# Patient Record
Sex: MALE | Race: WHITE | NOT HISPANIC OR LATINO | Employment: OTHER | URBAN - METROPOLITAN AREA
[De-identification: names, ages, dates, MRNs, and addresses within clinical notes are randomized per-mention and may not be internally consistent; named-entity substitution may affect disease eponyms.]

---

## 2018-07-24 ENCOUNTER — APPOINTMENT (OUTPATIENT)
Dept: PREADMISSION TESTING | Facility: HOSPITAL | Age: 58
End: 2018-07-24

## 2018-07-24 ENCOUNTER — HOSPITAL ENCOUNTER (OUTPATIENT)
Dept: GENERAL RADIOLOGY | Facility: HOSPITAL | Age: 58
Discharge: HOME | End: 2018-07-24
Admitting: COLON & RECTAL SURGERY

## 2018-07-24 VITALS — WEIGHT: 255.73 LBS | HEIGHT: 76 IN | BODY MASS INDEX: 31.14 KG/M2

## 2018-07-24 LAB
ANION GAP SERPL CALCULATED.3IONS-SCNC: 4 MMOL/L (ref 3–11)
APTT PPP: 26.8 SECONDS (ref 24–31)
BUN BLD-MCNC: 13 MG/DL (ref 9–23)
BUN/CREAT SERPL: 11.2 (ref 7–25)
CALCIUM SPEC-SCNC: 9.2 MG/DL (ref 8.7–10.4)
CHLORIDE SERPL-SCNC: 105 MMOL/L (ref 99–109)
CO2 SERPL-SCNC: 30 MMOL/L (ref 20–31)
CREAT BLD-MCNC: 1.16 MG/DL (ref 0.6–1.3)
DEPRECATED RDW RBC AUTO: 50.4 FL (ref 37–54)
ERYTHROCYTE [DISTWIDTH] IN BLOOD BY AUTOMATED COUNT: 18.4 % (ref 11.3–14.5)
GFR SERPL CREATININE-BSD FRML MDRD: 65 ML/MIN/1.73
GLUCOSE BLD-MCNC: 111 MG/DL (ref 70–100)
HBA1C MFR BLD: 5.9 % (ref 4.8–5.6)
HCT VFR BLD AUTO: 34.1 % (ref 38.9–50.9)
HGB BLD-MCNC: 9.9 G/DL (ref 13.1–17.5)
INR PPP: 1.03 (ref 0.91–1.09)
MCH RBC QN AUTO: 21.7 PG (ref 27–31)
MCHC RBC AUTO-ENTMCNC: 29 G/DL (ref 32–36)
MCV RBC AUTO: 74.8 FL (ref 80–99)
PLATELET # BLD AUTO: 116 10*3/MM3 (ref 150–450)
PMV BLD AUTO: 8.8 FL (ref 6–12)
POTASSIUM BLD-SCNC: 4 MMOL/L (ref 3.5–5.5)
PROTHROMBIN TIME: 10.8 SECONDS (ref 9.6–11.5)
RBC # BLD AUTO: 4.56 10*6/MM3 (ref 4.2–5.76)
SODIUM BLD-SCNC: 139 MMOL/L (ref 132–146)
WBC NRBC COR # BLD: 4.17 10*3/MM3 (ref 3.5–10.8)

## 2018-07-24 PROCEDURE — 80048 BASIC METABOLIC PNL TOTAL CA: CPT | Performed by: COLON & RECTAL SURGERY

## 2018-07-24 PROCEDURE — 83036 HEMOGLOBIN GLYCOSYLATED A1C: CPT | Performed by: COLON & RECTAL SURGERY

## 2018-07-24 PROCEDURE — 93005 ELECTROCARDIOGRAM TRACING: CPT

## 2018-07-24 PROCEDURE — 93010 ELECTROCARDIOGRAM REPORT: CPT | Performed by: INTERNAL MEDICINE

## 2018-07-24 PROCEDURE — 71046 X-RAY EXAM CHEST 2 VIEWS: CPT

## 2018-07-24 PROCEDURE — 36415 COLL VENOUS BLD VENIPUNCTURE: CPT

## 2018-07-24 PROCEDURE — 85730 THROMBOPLASTIN TIME PARTIAL: CPT | Performed by: ANESTHESIOLOGY

## 2018-07-24 PROCEDURE — 85027 COMPLETE CBC AUTOMATED: CPT | Performed by: COLON & RECTAL SURGERY

## 2018-07-24 PROCEDURE — 85610 PROTHROMBIN TIME: CPT | Performed by: ANESTHESIOLOGY

## 2018-07-24 RX ORDER — METOPROLOL TARTRATE 50 MG/1
50 TABLET, FILM COATED ORAL 2 TIMES DAILY
COMMUNITY

## 2018-07-24 RX ORDER — SUCRALFATE 1 G/1
1 TABLET ORAL 2 TIMES DAILY
COMMUNITY

## 2018-07-24 RX ORDER — LEVOTHYROXINE SODIUM 0.07 MG/1
75 TABLET ORAL DAILY
COMMUNITY

## 2018-07-24 RX ORDER — FUROSEMIDE 40 MG/1
40 TABLET ORAL DAILY
Status: ON HOLD | COMMUNITY
End: 2018-09-13

## 2018-07-24 RX ORDER — ASPIRIN 81 MG/1
81 TABLET ORAL DAILY
COMMUNITY

## 2018-07-24 RX ORDER — LANSOPRAZOLE 30 MG/1
30 CAPSULE, DELAYED RELEASE ORAL 2 TIMES DAILY
COMMUNITY

## 2018-07-24 RX ORDER — CHLORAL HYDRATE 500 MG
1000 CAPSULE ORAL 2 TIMES DAILY WITH MEALS
COMMUNITY

## 2018-07-24 RX ORDER — WARFARIN SODIUM 10 MG/1
10 TABLET ORAL
COMMUNITY

## 2018-07-24 NOTE — PAT
"Patient's PPM was interoggated in PAT - results on chart.  Dr. Peña \" okayed\" patient for surgery.  Patient instructed to drink 20 ounces (or until full) of Gatorade or 20 ounces of G2 (if diabetic) and complete 3 hours before your surgery start time. (NO RED Gatorade or G2)    Patient verbalized understanding.  "

## 2018-07-24 NOTE — DISCHARGE INSTRUCTIONS

## 2018-07-27 NOTE — PAT
"Spoke with Donna with Dr. George's office. Patient contacted Dr. George's office to tell them he had an appointment with his cardiologist,  \"A\" , and that he does not want him to have surgery until he has a heart cath scheduled for 8-7-18. Donna stated patient is cancelled and she will f/u with the patient after his heart cath. Donna stated patient is aware.   "

## 2018-07-27 NOTE — PAT
Myesha Virk, patients wife called PAT and stated the Patient saw his cardiologist after his PAT appointment for a checkup. His cardiologist wants him to have a heart cath before surgery. The heart cath is scheduled for 8-7-18. Dr. George's office notified; message left for Donna ; will f/u if no call back.

## 2018-09-10 ENCOUNTER — ANESTHESIA EVENT (OUTPATIENT)
Dept: PERIOP | Facility: HOSPITAL | Age: 58
End: 2018-09-10

## 2018-09-10 ENCOUNTER — HOSPITAL ENCOUNTER (INPATIENT)
Facility: HOSPITAL | Age: 58
LOS: 3 days | Discharge: HOME | End: 2018-09-13
Attending: COLON & RECTAL SURGERY | Admitting: COLON & RECTAL SURGERY

## 2018-09-10 ENCOUNTER — ANESTHESIA (OUTPATIENT)
Dept: PERIOP | Facility: HOSPITAL | Age: 58
End: 2018-09-10

## 2018-09-10 DIAGNOSIS — D12.6 TUBULOVILLOUS ADENOMA POLYP OF COLON: ICD-10-CM

## 2018-09-10 DIAGNOSIS — D12.2 ADENOMATOUS POLYP OF ASCENDING COLON: ICD-10-CM

## 2018-09-10 LAB
ANION GAP SERPL CALCULATED.3IONS-SCNC: 7 MMOL/L (ref 3–11)
APTT PPP: 27.8 SECONDS (ref 24–31)
BUN BLD-MCNC: 10 MG/DL (ref 9–23)
BUN/CREAT SERPL: 9 (ref 7–25)
CALCIUM SPEC-SCNC: 9.4 MG/DL (ref 8.7–10.4)
CHLORIDE SERPL-SCNC: 104 MMOL/L (ref 99–109)
CO2 SERPL-SCNC: 28 MMOL/L (ref 20–31)
CREAT BLD-MCNC: 1.11 MG/DL (ref 0.6–1.3)
DEPRECATED RDW RBC AUTO: 53.4 FL (ref 37–54)
ERYTHROCYTE [DISTWIDTH] IN BLOOD BY AUTOMATED COUNT: 18.2 % (ref 11.3–14.5)
GFR SERPL CREATININE-BSD FRML MDRD: 69 ML/MIN/1.73
GLUCOSE BLD-MCNC: 122 MG/DL (ref 70–100)
HCT VFR BLD AUTO: 39.7 % (ref 38.9–50.9)
HGB BLD-MCNC: 12.8 G/DL (ref 13.1–17.5)
INR PPP: 1.08 (ref 0.91–1.09)
MCH RBC QN AUTO: 26.1 PG (ref 27–31)
MCHC RBC AUTO-ENTMCNC: 32.2 G/DL (ref 32–36)
MCV RBC AUTO: 81 FL (ref 80–99)
PLATELET # BLD AUTO: 93 10*3/MM3 (ref 150–450)
PMV BLD AUTO: 8.5 FL (ref 6–12)
POTASSIUM BLD-SCNC: 3.3 MMOL/L (ref 3.5–5.5)
PROTHROMBIN TIME: 11.3 SECONDS (ref 9.6–11.5)
RBC # BLD AUTO: 4.9 10*6/MM3 (ref 4.2–5.76)
SODIUM BLD-SCNC: 139 MMOL/L (ref 132–146)
WBC NRBC COR # BLD: 4.56 10*3/MM3 (ref 3.5–10.8)

## 2018-09-10 PROCEDURE — 63710000001 HYDROCODONE-ACETAMINOPHEN 7.5-325 MG TABLET: Performed by: COLON & RECTAL SURGERY

## 2018-09-10 PROCEDURE — A9270 NON-COVERED ITEM OR SERVICE: HCPCS | Performed by: COLON & RECTAL SURGERY

## 2018-09-10 PROCEDURE — 25010000002 FENTANYL CITRATE (PF) 100 MCG/2ML SOLUTION: Performed by: NURSE ANESTHETIST, CERTIFIED REGISTERED

## 2018-09-10 PROCEDURE — 25010000002 PROPOFOL 10 MG/ML EMULSION: Performed by: NURSE ANESTHETIST, CERTIFIED REGISTERED

## 2018-09-10 PROCEDURE — 0DTF0ZZ RESECTION OF RIGHT LARGE INTESTINE, OPEN APPROACH: ICD-10-PCS | Performed by: COLON & RECTAL SURGERY

## 2018-09-10 PROCEDURE — 25010000002 MORPHINE SULFATE (PF) 2 MG/ML SOLUTION: Performed by: COLON & RECTAL SURGERY

## 2018-09-10 PROCEDURE — 85610 PROTHROMBIN TIME: CPT | Performed by: COLON & RECTAL SURGERY

## 2018-09-10 PROCEDURE — 63710000001 LEVOTHYROXINE 75 MCG TABLET: Performed by: COLON & RECTAL SURGERY

## 2018-09-10 PROCEDURE — 25010000002 NEOSTIGMINE 10 MG/10ML SOLUTION: Performed by: NURSE ANESTHETIST, CERTIFIED REGISTERED

## 2018-09-10 PROCEDURE — 63710000001 DIGOXIN 250 MCG TABLET: Performed by: COLON & RECTAL SURGERY

## 2018-09-10 PROCEDURE — 25010000002 ONDANSETRON PER 1 MG: Performed by: NURSE ANESTHETIST, CERTIFIED REGISTERED

## 2018-09-10 PROCEDURE — 80048 BASIC METABOLIC PNL TOTAL CA: CPT | Performed by: COLON & RECTAL SURGERY

## 2018-09-10 PROCEDURE — 25010000002 HEPARIN (PORCINE) 5000 UNIT/0.5ML SOLUTION: Performed by: COLON & RECTAL SURGERY

## 2018-09-10 PROCEDURE — 85027 COMPLETE CBC AUTOMATED: CPT | Performed by: COLON & RECTAL SURGERY

## 2018-09-10 PROCEDURE — 88309 TISSUE EXAM BY PATHOLOGIST: CPT | Performed by: COLON & RECTAL SURGERY

## 2018-09-10 PROCEDURE — 63710000001 DIAZEPAM 5 MG TABLET: Performed by: COLON & RECTAL SURGERY

## 2018-09-10 PROCEDURE — 25010000002 ERTAPENEM 1 GM/100ML SOLUTION: Performed by: COLON & RECTAL SURGERY

## 2018-09-10 PROCEDURE — 85730 THROMBOPLASTIN TIME PARTIAL: CPT | Performed by: COLON & RECTAL SURGERY

## 2018-09-10 PROCEDURE — 25010000002 DEXAMETHASONE PER 1 MG: Performed by: NURSE ANESTHETIST, CERTIFIED REGISTERED

## 2018-09-10 PROCEDURE — 0WQF0ZZ REPAIR ABDOMINAL WALL, OPEN APPROACH: ICD-10-PCS | Performed by: COLON & RECTAL SURGERY

## 2018-09-10 PROCEDURE — 25010000002 HEPARIN (PORCINE) PER 1000 UNITS: Performed by: COLON & RECTAL SURGERY

## 2018-09-10 PROCEDURE — 63710000001 METOPROLOL TARTRATE 50 MG TABLET: Performed by: COLON & RECTAL SURGERY

## 2018-09-10 PROCEDURE — 25010000002 BUPRENORPHINE PER 0.1 MG: Performed by: NURSE ANESTHETIST, CERTIFIED REGISTERED

## 2018-09-10 PROCEDURE — 63710000001 ACETAMINOPHEN 500 MG TABLET: Performed by: COLON & RECTAL SURGERY

## 2018-09-10 RX ORDER — DEXAMETHASONE SODIUM PHOSPHATE 4 MG/ML
INJECTION, SOLUTION INTRA-ARTICULAR; INTRALESIONAL; INTRAMUSCULAR; INTRAVENOUS; SOFT TISSUE AS NEEDED
Status: DISCONTINUED | OUTPATIENT
Start: 2018-09-10 | End: 2018-09-10 | Stop reason: SURG

## 2018-09-10 RX ORDER — LIDOCAINE HYDROCHLORIDE 10 MG/ML
INJECTION, SOLUTION EPIDURAL; INFILTRATION; INTRACAUDAL; PERINEURAL AS NEEDED
Status: DISCONTINUED | OUTPATIENT
Start: 2018-09-10 | End: 2018-09-10 | Stop reason: SURG

## 2018-09-10 RX ORDER — DIGOXIN 250 MCG
250 TABLET ORAL
Status: DISCONTINUED | OUTPATIENT
Start: 2018-09-10 | End: 2018-09-13 | Stop reason: HOSPADM

## 2018-09-10 RX ORDER — METOPROLOL TARTRATE 50 MG/1
50 TABLET, FILM COATED ORAL EVERY 12 HOURS SCHEDULED
Status: DISCONTINUED | OUTPATIENT
Start: 2018-09-10 | End: 2018-09-13 | Stop reason: HOSPADM

## 2018-09-10 RX ORDER — ALVIMOPAN 12 MG/1
12 CAPSULE ORAL 2 TIMES DAILY
Status: DISCONTINUED | OUTPATIENT
Start: 2018-09-11 | End: 2018-09-13 | Stop reason: HOSPADM

## 2018-09-10 RX ORDER — PROMETHAZINE HYDROCHLORIDE 25 MG/ML
6.25 INJECTION, SOLUTION INTRAMUSCULAR; INTRAVENOUS ONCE AS NEEDED
Status: DISCONTINUED | OUTPATIENT
Start: 2018-09-10 | End: 2018-09-10 | Stop reason: HOSPADM

## 2018-09-10 RX ORDER — SODIUM CHLORIDE 0.9 % (FLUSH) 0.9 %
1-10 SYRINGE (ML) INJECTION AS NEEDED
Status: DISCONTINUED | OUTPATIENT
Start: 2018-09-10 | End: 2018-09-10 | Stop reason: HOSPADM

## 2018-09-10 RX ORDER — MORPHINE SULFATE 2 MG/ML
2 INJECTION, SOLUTION INTRAMUSCULAR; INTRAVENOUS
Status: DISCONTINUED | OUTPATIENT
Start: 2018-09-10 | End: 2018-09-13 | Stop reason: HOSPADM

## 2018-09-10 RX ORDER — HEPARIN SODIUM 5000 [USP'U]/ML
INJECTION, SOLUTION INTRAVENOUS; SUBCUTANEOUS AS NEEDED
Status: DISCONTINUED | OUTPATIENT
Start: 2018-09-10 | End: 2018-09-10 | Stop reason: HOSPADM

## 2018-09-10 RX ORDER — HEPARIN SODIUM 5000 [USP'U]/.5ML
5000 INJECTION, SOLUTION INTRAVENOUS; SUBCUTANEOUS ONCE
Status: DISCONTINUED | OUTPATIENT
Start: 2018-09-10 | End: 2018-09-10 | Stop reason: HOSPADM

## 2018-09-10 RX ORDER — ONDANSETRON 2 MG/ML
4 INJECTION INTRAMUSCULAR; INTRAVENOUS EVERY 6 HOURS PRN
Status: DISCONTINUED | OUTPATIENT
Start: 2018-09-10 | End: 2018-09-13 | Stop reason: HOSPADM

## 2018-09-10 RX ORDER — SODIUM CHLORIDE, SODIUM LACTATE, POTASSIUM CHLORIDE, CALCIUM CHLORIDE 600; 310; 30; 20 MG/100ML; MG/100ML; MG/100ML; MG/100ML
INJECTION, SOLUTION INTRAVENOUS CONTINUOUS PRN
Status: DISCONTINUED | OUTPATIENT
Start: 2018-09-10 | End: 2018-09-10 | Stop reason: SURG

## 2018-09-10 RX ORDER — BUPIVACAINE HYDROCHLORIDE 2.5 MG/ML
INJECTION, SOLUTION EPIDURAL; INFILTRATION; INTRACAUDAL AS NEEDED
Status: DISCONTINUED | OUTPATIENT
Start: 2018-09-10 | End: 2018-09-10 | Stop reason: SURG

## 2018-09-10 RX ORDER — FENTANYL CITRATE 50 UG/ML
INJECTION, SOLUTION INTRAMUSCULAR; INTRAVENOUS AS NEEDED
Status: DISCONTINUED | OUTPATIENT
Start: 2018-09-10 | End: 2018-09-10 | Stop reason: SURG

## 2018-09-10 RX ORDER — LEVOTHYROXINE SODIUM 0.07 MG/1
75 TABLET ORAL
Status: DISCONTINUED | OUTPATIENT
Start: 2018-09-10 | End: 2018-09-13 | Stop reason: HOSPADM

## 2018-09-10 RX ORDER — PROPOFOL 10 MG/ML
VIAL (ML) INTRAVENOUS AS NEEDED
Status: DISCONTINUED | OUTPATIENT
Start: 2018-09-10 | End: 2018-09-10 | Stop reason: SURG

## 2018-09-10 RX ORDER — SODIUM CHLORIDE, SODIUM LACTATE, POTASSIUM CHLORIDE, CALCIUM CHLORIDE 600; 310; 30; 20 MG/100ML; MG/100ML; MG/100ML; MG/100ML
9 INJECTION, SOLUTION INTRAVENOUS CONTINUOUS PRN
Status: DISCONTINUED | OUTPATIENT
Start: 2018-09-10 | End: 2018-09-13 | Stop reason: HOSPADM

## 2018-09-10 RX ORDER — ROCURONIUM BROMIDE 10 MG/ML
INJECTION, SOLUTION INTRAVENOUS AS NEEDED
Status: DISCONTINUED | OUTPATIENT
Start: 2018-09-10 | End: 2018-09-10 | Stop reason: SURG

## 2018-09-10 RX ORDER — SODIUM CHLORIDE 9 MG/ML
100 INJECTION, SOLUTION INTRAVENOUS ONCE
Status: COMPLETED | OUTPATIENT
Start: 2018-09-10 | End: 2018-09-11

## 2018-09-10 RX ORDER — NALOXONE HCL 0.4 MG/ML
0.4 VIAL (ML) INJECTION
Status: DISCONTINUED | OUTPATIENT
Start: 2018-09-10 | End: 2018-09-13 | Stop reason: HOSPADM

## 2018-09-10 RX ORDER — NEOSTIGMINE METHYLSULFATE 1 MG/ML
INJECTION, SOLUTION INTRAVENOUS AS NEEDED
Status: DISCONTINUED | OUTPATIENT
Start: 2018-09-10 | End: 2018-09-10 | Stop reason: SURG

## 2018-09-10 RX ORDER — PROMETHAZINE HYDROCHLORIDE 25 MG/1
25 TABLET ORAL ONCE AS NEEDED
Status: DISCONTINUED | OUTPATIENT
Start: 2018-09-10 | End: 2018-09-10 | Stop reason: HOSPADM

## 2018-09-10 RX ORDER — BUPRENORPHINE HYDROCHLORIDE 0.32 MG/ML
INJECTION INTRAMUSCULAR; INTRAVENOUS AS NEEDED
Status: DISCONTINUED | OUTPATIENT
Start: 2018-09-10 | End: 2018-09-10 | Stop reason: SURG

## 2018-09-10 RX ORDER — GLYCOPYRROLATE 0.2 MG/ML
INJECTION INTRAMUSCULAR; INTRAVENOUS AS NEEDED
Status: DISCONTINUED | OUTPATIENT
Start: 2018-09-10 | End: 2018-09-10 | Stop reason: SURG

## 2018-09-10 RX ORDER — ACETAMINOPHEN 500 MG
1000 TABLET ORAL 3 TIMES DAILY
Status: DISCONTINUED | OUTPATIENT
Start: 2018-09-10 | End: 2018-09-13 | Stop reason: HOSPADM

## 2018-09-10 RX ORDER — HYDROMORPHONE HYDROCHLORIDE 1 MG/ML
0.5 INJECTION, SOLUTION INTRAMUSCULAR; INTRAVENOUS; SUBCUTANEOUS
Status: DISCONTINUED | OUTPATIENT
Start: 2018-09-10 | End: 2018-09-10 | Stop reason: HOSPADM

## 2018-09-10 RX ORDER — FENTANYL CITRATE 50 UG/ML
50 INJECTION, SOLUTION INTRAMUSCULAR; INTRAVENOUS
Status: DISCONTINUED | OUTPATIENT
Start: 2018-09-10 | End: 2018-09-10 | Stop reason: HOSPADM

## 2018-09-10 RX ORDER — ONDANSETRON 2 MG/ML
4 INJECTION INTRAMUSCULAR; INTRAVENOUS ONCE AS NEEDED
Status: DISCONTINUED | OUTPATIENT
Start: 2018-09-10 | End: 2018-09-10 | Stop reason: HOSPADM

## 2018-09-10 RX ORDER — MAGNESIUM HYDROXIDE 1200 MG/15ML
LIQUID ORAL AS NEEDED
Status: DISCONTINUED | OUTPATIENT
Start: 2018-09-10 | End: 2018-09-10 | Stop reason: HOSPADM

## 2018-09-10 RX ORDER — LIDOCAINE HYDROCHLORIDE 10 MG/ML
0.5 INJECTION, SOLUTION EPIDURAL; INFILTRATION; INTRACAUDAL; PERINEURAL ONCE AS NEEDED
Status: COMPLETED | OUTPATIENT
Start: 2018-09-10 | End: 2018-09-10

## 2018-09-10 RX ORDER — MEPERIDINE HYDROCHLORIDE 25 MG/ML
12.5 INJECTION INTRAMUSCULAR; INTRAVENOUS; SUBCUTANEOUS
Status: DISCONTINUED | OUTPATIENT
Start: 2018-09-10 | End: 2018-09-10 | Stop reason: HOSPADM

## 2018-09-10 RX ORDER — HYDROCODONE BITARTRATE AND ACETAMINOPHEN 7.5; 325 MG/1; MG/1
1 TABLET ORAL EVERY 4 HOURS PRN
Status: DISCONTINUED | OUTPATIENT
Start: 2018-09-10 | End: 2018-09-13 | Stop reason: HOSPADM

## 2018-09-10 RX ORDER — FAMOTIDINE 20 MG/1
20 TABLET, FILM COATED ORAL
Status: DISCONTINUED | OUTPATIENT
Start: 2018-09-10 | End: 2018-09-10 | Stop reason: HOSPADM

## 2018-09-10 RX ORDER — PANTOPRAZOLE SODIUM 40 MG/1
40 TABLET, DELAYED RELEASE ORAL EVERY MORNING
Status: DISCONTINUED | OUTPATIENT
Start: 2018-09-10 | End: 2018-09-13 | Stop reason: HOSPADM

## 2018-09-10 RX ORDER — ONDANSETRON 2 MG/ML
INJECTION INTRAMUSCULAR; INTRAVENOUS AS NEEDED
Status: DISCONTINUED | OUTPATIENT
Start: 2018-09-10 | End: 2018-09-10 | Stop reason: SURG

## 2018-09-10 RX ORDER — PROMETHAZINE HYDROCHLORIDE 25 MG/1
25 SUPPOSITORY RECTAL ONCE AS NEEDED
Status: DISCONTINUED | OUTPATIENT
Start: 2018-09-10 | End: 2018-09-10 | Stop reason: HOSPADM

## 2018-09-10 RX ORDER — DIAZEPAM 5 MG/1
5 TABLET ORAL EVERY 6 HOURS PRN
Status: DISCONTINUED | OUTPATIENT
Start: 2018-09-10 | End: 2018-09-13 | Stop reason: HOSPADM

## 2018-09-10 RX ADMIN — BUPRENORPHINE HYDROCHLORIDE 300 MCG: 0.32 INJECTION INTRAMUSCULAR; INTRAVENOUS at 10:16

## 2018-09-10 RX ADMIN — DIAZEPAM 5 MG: 5 TABLET ORAL at 20:29

## 2018-09-10 RX ADMIN — ACETAMINOPHEN 1000 MG: 500 TABLET, FILM COATED ORAL at 20:29

## 2018-09-10 RX ADMIN — ERTAPENEM SODIUM 1 G: 1 INJECTION, POWDER, LYOPHILIZED, FOR SOLUTION INTRAMUSCULAR; INTRAVENOUS at 10:17

## 2018-09-10 RX ADMIN — DEXAMETHASONE SODIUM PHOSPHATE 8 MG: 4 INJECTION, SOLUTION INTRAMUSCULAR; INTRAVENOUS at 10:26

## 2018-09-10 RX ADMIN — SODIUM CHLORIDE 100 ML/HR: 9 INJECTION, SOLUTION INTRAVENOUS at 13:23

## 2018-09-10 RX ADMIN — FAMOTIDINE 20 MG: 20 TABLET, FILM COATED ORAL at 09:28

## 2018-09-10 RX ADMIN — SODIUM CHLORIDE, POTASSIUM CHLORIDE, SODIUM LACTATE AND CALCIUM CHLORIDE 9 ML/HR: 600; 310; 30; 20 INJECTION, SOLUTION INTRAVENOUS at 09:29

## 2018-09-10 RX ADMIN — FENTANYL CITRATE 50 MCG: 50 INJECTION, SOLUTION INTRAMUSCULAR; INTRAVENOUS at 12:49

## 2018-09-10 RX ADMIN — MORPHINE SULFATE 2 MG: 2 INJECTION, SOLUTION INTRAMUSCULAR; INTRAVENOUS at 14:36

## 2018-09-10 RX ADMIN — NEOSTIGMINE METHYLSULFATE 2 MG: 1 INJECTION, SOLUTION INTRAVENOUS at 11:37

## 2018-09-10 RX ADMIN — MORPHINE SULFATE 2 MG: 2 INJECTION, SOLUTION INTRAMUSCULAR; INTRAVENOUS at 13:28

## 2018-09-10 RX ADMIN — PROPOFOL 180 MG: 10 INJECTION, EMULSION INTRAVENOUS at 10:14

## 2018-09-10 RX ADMIN — ROCURONIUM BROMIDE 10 MG: 10 SOLUTION INTRAVENOUS at 10:43

## 2018-09-10 RX ADMIN — HEPARIN SODIUM 5000 UNITS: 10000 INJECTION, SOLUTION INTRAVENOUS; SUBCUTANEOUS at 13:22

## 2018-09-10 RX ADMIN — BUPIVACAINE HYDROCHLORIDE 60 ML: 2.5 INJECTION, SOLUTION EPIDURAL; INFILTRATION; INTRACAUDAL; PERINEURAL at 10:16

## 2018-09-10 RX ADMIN — DIGOXIN 250 MCG: 250 TABLET ORAL at 13:28

## 2018-09-10 RX ADMIN — METOPROLOL TARTRATE 50 MG: 50 TABLET ORAL at 20:29

## 2018-09-10 RX ADMIN — ROCURONIUM BROMIDE 40 MG: 10 SOLUTION INTRAVENOUS at 10:14

## 2018-09-10 RX ADMIN — SODIUM CHLORIDE, POTASSIUM CHLORIDE, SODIUM LACTATE AND CALCIUM CHLORIDE: 600; 310; 30; 20 INJECTION, SOLUTION INTRAVENOUS at 10:52

## 2018-09-10 RX ADMIN — FENTANYL CITRATE 50 MCG: 50 INJECTION, SOLUTION INTRAMUSCULAR; INTRAVENOUS at 12:39

## 2018-09-10 RX ADMIN — DEXAMETHASONE SODIUM PHOSPHATE 4 MG: 4 INJECTION, SOLUTION INTRAMUSCULAR; INTRAVENOUS at 10:16

## 2018-09-10 RX ADMIN — LEVOTHYROXINE SODIUM 75 MCG: 75 TABLET ORAL at 13:28

## 2018-09-10 RX ADMIN — SODIUM CHLORIDE, POTASSIUM CHLORIDE, SODIUM LACTATE AND CALCIUM CHLORIDE: 600; 310; 30; 20 INJECTION, SOLUTION INTRAVENOUS at 11:41

## 2018-09-10 RX ADMIN — GLYCOPYRROLATE 0.4 MG: 0.2 INJECTION, SOLUTION INTRAMUSCULAR; INTRAVENOUS at 11:37

## 2018-09-10 RX ADMIN — ONDANSETRON 4 MG: 2 INJECTION INTRAMUSCULAR; INTRAVENOUS at 11:37

## 2018-09-10 RX ADMIN — LIDOCAINE HYDROCHLORIDE 50 MG: 10 INJECTION, SOLUTION EPIDURAL; INFILTRATION; INTRACAUDAL; PERINEURAL at 10:14

## 2018-09-10 RX ADMIN — DIAZEPAM 5 MG: 5 TABLET ORAL at 14:36

## 2018-09-10 RX ADMIN — FENTANYL CITRATE 100 MCG: 50 INJECTION, SOLUTION INTRAMUSCULAR; INTRAVENOUS at 10:14

## 2018-09-10 RX ADMIN — LIDOCAINE HYDROCHLORIDE 0.5 ML: 10 INJECTION, SOLUTION EPIDURAL; INFILTRATION; INTRACAUDAL; PERINEURAL at 09:29

## 2018-09-10 RX ADMIN — SODIUM CHLORIDE, POTASSIUM CHLORIDE, SODIUM LACTATE AND CALCIUM CHLORIDE: 600; 310; 30; 20 INJECTION, SOLUTION INTRAVENOUS at 09:21

## 2018-09-10 RX ADMIN — HYDROCODONE BITARTRATE AND ACETAMINOPHEN 1 TABLET: 7.5; 325 TABLET ORAL at 20:29

## 2018-09-10 RX ADMIN — MORPHINE SULFATE 2 MG: 2 INJECTION, SOLUTION INTRAMUSCULAR; INTRAVENOUS at 19:26

## 2018-09-10 RX ADMIN — ROCURONIUM BROMIDE 20 MG: 10 SOLUTION INTRAVENOUS at 10:59

## 2018-09-10 NOTE — ANESTHESIA PROCEDURE NOTES
Peripheral Block    Patient location during procedure: OR  Reason for block: at surgeon's request and post-op pain management  Performed by  Anesthesiologist: TOMMY CASTILLO  Preanesthetic Checklist  Completed: patient identified, site marked, surgical consent, pre-op evaluation, timeout performed, IV checked, risks and benefits discussed and monitors and equipment checked  Prep:  Pt Position: supine  Sterile barriers:cap, gloves, sterile barriers and mask  Prep: ChloraPrep  Patient monitoring: blood pressure monitoring, continuous pulse oximetry and EKG  Procedure  Sedation:yes  Performed under: general  Guidance:ultrasound guided  Images:still images obtained    Laterality:Bilateral  Block Type:TAP  Injection Technique:single-shot  Needle Type:short-bevel and echogenic  Needle Gauge:20 G    Medications  Comment:Block Injection:  LA dose divided between Right and Left block       Adjuncts:  Decadron 4mg PSF, Buprenex 0.3mg (Per total volume of LA)  Local Injected:bupivacaine 0.25% Local Amount Injected:60mL  Post Assessment  Injection Assessment: negative aspiration for heme, incremental injection and no paresthesia on injection  Patient Tolerance:comfortable throughout block  Complications:no  Additional Notes      Under Ultrasound guidance, a BBraun 4inch 360 degree needle was advanced with Normal Saline hydro dissection of tissue.  The Internal Oblique and Transversus Abdominus muscles where visualized.  At or before the aponeurosis of Internal Oblique, local anesthetic spread was visualized in the Transversus Abdominus Plane. Injection was made incrementally with aspiration every 5 mls.  There was no  intravascular injection,  injection pressure was normal, there was no neural injection, and the procedure was completed without difficulty.  Thank You.

## 2018-09-10 NOTE — ANESTHESIA POSTPROCEDURE EVALUATION
Patient: Leandro Virk    Procedure Summary     Date:  09/10/18 Room / Location:   [facility]    Anesthesia Start:  1010 Anesthesia Stop:  1157    Procedure:  RIGHT COLECTOMY WITH UMBILICAL HERNIA REPAIR (Right Abdomen) Diagnosis:      Surgeon:  Leandro George MD Provider:  Deonte Rosas MD    Anesthesia Type:  general, regional ASA Status:  3          Anesthesia Type: general, regional  Last vitals  BP   149/91 (09/10/18 1156)   Temp   98.8 °F (37.1 °C) (09/10/18 1156)   Pulse   60 (09/10/18 1156)   Resp   16 (09/10/18 1156)     SpO2   98 % (09/10/18 1156)     Post Anesthesia Care and Evaluation    Patient location during evaluation: PACU  Patient participation: complete - patient participated  Level of consciousness: awake and alert  Pain score: 0  Pain management: adequate  Airway patency: patent  Anesthetic complications: No anesthetic complications  PONV Status: none  Cardiovascular status: hemodynamically stable and acceptable  Respiratory status: nonlabored ventilation, acceptable and nasal cannula  Hydration status: acceptable

## 2018-09-10 NOTE — PLAN OF CARE
Problem: Patient Care Overview  Goal: Plan of Care Review  Outcome: Ongoing (interventions implemented as appropriate)   09/10/18 1536   Coping/Psychosocial   Plan of Care Reviewed With patient;spouse   Plan of Care Review   Progress no change     Goal: Individualization and Mutuality  Outcome: Outcome(s) achieved Date Met: 09/10/18    Goal: Discharge Needs Assessment  Outcome: Ongoing (interventions implemented as appropriate)   09/10/18 1536   Discharge Needs Assessment   Readmission Within the Last 30 Days no previous admission in last 30 days   Concerns to be Addressed no discharge needs identified   Patient/Family Anticipates Transition to home   Patient/Family Anticipated Services at Transition none   Transportation Concerns car, none   Transportation Anticipated family or friend will provide   Anticipated Changes Related to Illness none   Equipment Needed After Discharge none   Disability   Equipment Currently Used at Home none     Goal: Interprofessional Rounds/Family Conf  Outcome: Ongoing (interventions implemented as appropriate)   09/10/18 1536   Interdisciplinary Rounds/Family Conf   Participants nursing       Problem: Infection, Risk/Actual (Adult)  Goal: Identify Related Risk Factors and Signs and Symptoms  Outcome: Outcome(s) achieved Date Met: 09/10/18   09/10/18 1536   Infection, Risk/Actual (Adult)   Related Risk Factors (Infection, Risk/Actual) surgery/procedure;skin integrity impairment   Signs and Symptoms (Infection, Risk/Actual) pain     Goal: Infection Prevention/Resolution  Outcome: Ongoing (interventions implemented as appropriate)   09/10/18 1536   Infection, Risk/Actual (Adult)   Infection Prevention/Resolution making progress toward outcome       Problem: Pain, Acute (Adult)  Goal: Identify Related Risk Factors and Signs and Symptoms  Outcome: Outcome(s) achieved Date Met: 09/10/18   09/10/18 1536   Pain, Acute (Adult)   Related Risk Factors (Acute Pain) surgery;persistent pain   Signs  and Symptoms (Acute Pain) facial mask of pain/grimace;guarding/abnormal posturing/positioning     Goal: Acceptable Pain Control/Comfort Level  Outcome: Ongoing (interventions implemented as appropriate)   09/10/18 1536   Pain, Acute (Adult)   Acceptable Pain Control/Comfort Level making progress toward outcome

## 2018-09-10 NOTE — OP NOTE
Colon and Rectal [[facility]]    COLON RESECTION RIGHT  Procedure Note    Leandro Virk  9/10/2018    Pre-op Diagnosis:   Persistent cecal tubular adenoma.  Umbilical hernia    Post-op Diagnosis:     Same    Procedure(s):  RIGHT COLECTOMY WITH UMBILICAL HERNIA REPAIR    Surgeon(s):  Leandro George MD    Anesthesia: General    Staff:   Circulator: Yelena Thompson RN; Roula Singh RN  Scrub Person: Irma Benavides  Assistant: Chanel Holt PA-C    Estimated Blood Loss: 300 mL    Specimens:                  Order Name Source Comment Collection Info Order Time   CBC (NO DIFF)   Collected By: Hattie Aguirre RN 9/10/2018  9:07 AM   BASIC METABOLIC PANEL   Collected By: Hattie Aguirre RN 9/10/2018  9:07 AM   PROTIME-INR   Collected By: Hattie Aguirre RN 9/10/2018  9:07 AM   APTT   Collected By: Hattie Aguirre RN 9/10/2018  9:07 AM    Patient Receiving Heparin Therapy?  No      TISSUE PATHOLOGY EXAM Large Intestine, Left / Descending Colon   Ileo-cecal valve polyp. Collected By: Leandro George MD 9/10/2018 11:45 AM         Drains:   Urethral Catheter Silicone 16 Fr. (Active)       Findings: Persistent scar and polyp on the ileocecal valve causing a mild stenosis at the valve.  Omentum in an incarcerated umbilical hernia.  Normal liver gallbladder and retroperitoneum.    Technique: The patient was taken to the operating room and placed under general anesthesia and positioned supine with a Larson catheter.  His abdomen was prepped and draped properly.  After a timeout a vertical incision was made from the umbilicus distally and the abdomen entered.  The lower part of the umbilical hernia was cut into and the omental pad trapped and there was freed up.  The entire omental sac was removed and then the umbilicus was freed up from the fascia.  Exploration was unremarkable and I could not feel the polyp.  Diverticuli were noted.  The right colon was mobilized along the line of Toldt and the  hepatic flexure taken down and the omentum taken off of it.  The ileum had to be mobilized more than anything else.  Once I had the hepatic flexure out into the wound has able to line the ileum up next to it, make anti-mesenteric enterotomies in each and then to the side to side anastomosis with a Green 75 linear stapler.  Reapplication of the stapler perpendicular to the original firing was used to complete the resection and close enterotomy.  The specimen was later opened and a back table.  The anastomosis was irrigated and then I buried both ends of the anastomosis into the perspective bowel with a 3-0 Vicryl which I ran the entire length of the wound.  Irrigation was carried out again and then GI contents were placed back anatomically.  Sponge instrument and needle count was reported as correct.  The fascia was then closed in a single layer with #1 PDS taking care to closing umbilical hernia.  The subcutaneous tissues irrigated with saline and skin closed with staples.  The patient tolerated the procedure well and was taken to the recovery room in satisfactory condition.    Complications: 0    Leandro George MD     Date: 9/10/2018  Time: 12:06 PM

## 2018-09-10 NOTE — ANESTHESIA PREPROCEDURE EVALUATION
Anesthesia Evaluation                  Airway   Mallampati: II  Dental      Pulmonary    (+) COPD,   Cardiovascular     (+) hypertension, CABG, CHF,       Neuro/Psych  GI/Hepatic/Renal/Endo      Musculoskeletal     Abdominal    Substance History      OB/GYN          Other                        Anesthesia Plan    ASA 3     general and regional     intravenous induction   Anesthetic plan, all risks, benefits, and alternatives have been provided, discussed and informed consent has been obtained with: patient.    Plan discussed with CRNA.

## 2018-09-10 NOTE — ANESTHESIA PROCEDURE NOTES
Airway  Urgency: elective      General Information and Staff    Patient location during procedure: OR  CRNA: JO-ANN POLLACK    Indications and Patient Condition  Indications for airway management: airway protection    Preoxygenated: yes  MILS not maintained throughout  Mask difficulty assessment: 0 - not attempted    Final Airway Details  Final airway type: endotracheal airway      Successful airway: ETT  Cuffed: yes   Successful intubation technique: direct laryngoscopy  Endotracheal tube insertion site: oral  Blade: Izaguirre  Blade size: 2  ETT size: 7.5 mm  Cormack-Lehane Classification: grade IIa - partial view of glottis  Placement verified by: chest auscultation and capnometry   Cuff volume (mL): 8  Measured from: lips  ETT to lips (cm): 23  Number of attempts at approach: 1

## 2018-09-11 LAB
GLUCOSE BLDC GLUCOMTR-MCNC: 113 MG/DL (ref 70–130)
INR PPP: 1.05 (ref 0.91–1.09)
PROTHROMBIN TIME: 11 SECONDS (ref 9.6–11.5)

## 2018-09-11 PROCEDURE — A9270 NON-COVERED ITEM OR SERVICE: HCPCS | Performed by: COLON & RECTAL SURGERY

## 2018-09-11 PROCEDURE — 63710000001 DIGOXIN 250 MCG TABLET: Performed by: COLON & RECTAL SURGERY

## 2018-09-11 PROCEDURE — 63710000001 HYDROCODONE-ACETAMINOPHEN 7.5-325 MG TABLET: Performed by: COLON & RECTAL SURGERY

## 2018-09-11 PROCEDURE — 63710000001 METOPROLOL TARTRATE 50 MG TABLET: Performed by: COLON & RECTAL SURGERY

## 2018-09-11 PROCEDURE — 85610 PROTHROMBIN TIME: CPT | Performed by: COLON & RECTAL SURGERY

## 2018-09-11 PROCEDURE — 63710000001 ALVIMOPAN 12 MG CAPSULE: Performed by: COLON & RECTAL SURGERY

## 2018-09-11 PROCEDURE — 25010000002 MORPHINE SULFATE (PF) 2 MG/ML SOLUTION: Performed by: COLON & RECTAL SURGERY

## 2018-09-11 PROCEDURE — 63710000001 DIAZEPAM 5 MG TABLET: Performed by: COLON & RECTAL SURGERY

## 2018-09-11 PROCEDURE — 82962 GLUCOSE BLOOD TEST: CPT

## 2018-09-11 PROCEDURE — 99222 1ST HOSP IP/OBS MODERATE 55: CPT | Performed by: NURSE PRACTITIONER

## 2018-09-11 PROCEDURE — 25010000002 ENOXAPARIN PER 10 MG: Performed by: COLON & RECTAL SURGERY

## 2018-09-11 PROCEDURE — 63710000001 LEVOTHYROXINE 75 MCG TABLET: Performed by: COLON & RECTAL SURGERY

## 2018-09-11 PROCEDURE — 63710000001 PANTOPRAZOLE 40 MG TABLET DELAYED-RELEASE: Performed by: COLON & RECTAL SURGERY

## 2018-09-11 RX ORDER — WARFARIN SODIUM 4 MG/1
12 TABLET ORAL
Status: DISCONTINUED | OUTPATIENT
Start: 2018-09-11 | End: 2018-09-12

## 2018-09-11 RX ORDER — WARFARIN SODIUM 5 MG/1
5 TABLET ORAL
Status: DISCONTINUED | OUTPATIENT
Start: 2018-09-11 | End: 2018-09-11

## 2018-09-11 RX ADMIN — DIGOXIN 250 MCG: 250 TABLET ORAL at 12:18

## 2018-09-11 RX ADMIN — PANTOPRAZOLE SODIUM 40 MG: 40 TABLET, DELAYED RELEASE ORAL at 09:22

## 2018-09-11 RX ADMIN — MORPHINE SULFATE 2 MG: 2 INJECTION, SOLUTION INTRAMUSCULAR; INTRAVENOUS at 00:51

## 2018-09-11 RX ADMIN — ACETAMINOPHEN 1000 MG: 500 TABLET, FILM COATED ORAL at 19:58

## 2018-09-11 RX ADMIN — MORPHINE SULFATE 2 MG: 2 INJECTION, SOLUTION INTRAMUSCULAR; INTRAVENOUS at 21:14

## 2018-09-11 RX ADMIN — HYDROCODONE BITARTRATE AND ACETAMINOPHEN 1 TABLET: 7.5; 325 TABLET ORAL at 05:16

## 2018-09-11 RX ADMIN — HYDROCODONE BITARTRATE AND ACETAMINOPHEN 1 TABLET: 7.5; 325 TABLET ORAL at 19:58

## 2018-09-11 RX ADMIN — HYDROCODONE BITARTRATE AND ACETAMINOPHEN 1 TABLET: 7.5; 325 TABLET ORAL at 09:21

## 2018-09-11 RX ADMIN — DIAZEPAM 5 MG: 5 TABLET ORAL at 19:58

## 2018-09-11 RX ADMIN — DIAZEPAM 5 MG: 5 TABLET ORAL at 12:18

## 2018-09-11 RX ADMIN — DIAZEPAM 5 MG: 5 TABLET ORAL at 05:16

## 2018-09-11 RX ADMIN — METOPROLOL TARTRATE 50 MG: 50 TABLET ORAL at 19:58

## 2018-09-11 RX ADMIN — ALVIMOPAN 12 MG: 12 CAPSULE ORAL at 19:58

## 2018-09-11 RX ADMIN — METOPROLOL TARTRATE 50 MG: 50 TABLET ORAL at 09:22

## 2018-09-11 RX ADMIN — WARFARIN SODIUM 12 MG: 4 TABLET ORAL at 21:20

## 2018-09-11 RX ADMIN — ALVIMOPAN 12 MG: 12 CAPSULE ORAL at 09:22

## 2018-09-11 RX ADMIN — LEVOTHYROXINE SODIUM 75 MCG: 75 TABLET ORAL at 05:16

## 2018-09-11 RX ADMIN — ENOXAPARIN SODIUM 40 MG: 40 INJECTION SUBCUTANEOUS at 09:21

## 2018-09-11 RX ADMIN — PANTOPRAZOLE SODIUM 40 MG: 40 TABLET, DELAYED RELEASE ORAL at 05:16

## 2018-09-11 RX ADMIN — HYDROCODONE BITARTRATE AND ACETAMINOPHEN 1 TABLET: 7.5; 325 TABLET ORAL at 13:33

## 2018-09-11 NOTE — PROGRESS NOTES
"Colon and Rectal [[facility]]    POD # 1    /75 (BP Location: Left arm, Patient Position: Sitting)   Pulse 60   Temp 96.5 °F (35.8 °C) (Oral)   Resp 16   Ht 193 cm (76\")   Wt 113 kg (249 lb)   SpO2 93%   BMI 30.31 kg/m²     Lab Results (last 24 hours)     Procedure Component Value Units Date/Time    POC Glucose Once [[id number]]  (Normal) Collected:  09/11/18 1555    Specimen:  Blood Updated:  09/11/18 1556     Glucose 113 mg/dL     Narrative:       Meter: [id number] : [id number] Jalen Jordanlla          I/O this shift:  In: -   Out: 800 [Urine:800]    Alert and oriented.  No nausea or vomiting.  Good pain control  Good UO. Labs stable  No flatus or stool yet.  Stable post op course.  Pathology pending.  Restart Coumadin    Order Name Source Comment Collection Info Order Time   CBC (NO DIFF)   Collected By: Hattie Aguirre RN 9/10/2018  9:07 AM   BASIC METABOLIC PANEL   Collected By: Hattie Aguirre RN 9/10/2018  9:07 AM   PROTIME-INR   Collected By: Hattie Aguirre RN 9/10/2018  9:07 AM   APTT   Collected By: Hattie Aguirre RN 9/10/2018  9:07 AM    Patient Receiving Heparin Therapy?  No      TISSUE PATHOLOGY EXAM Large Intestine, Right / Ascending Colon   Ileo-cecal valve polyp. Collected By: Leandro George MD 9/10/2018 11:45 AM   .    Leandro George MD  09/11/18  6:46 PM    "

## 2018-09-11 NOTE — CONSULTS
[facility] Medicine Services  CONSULT NOTE      Patient Name: Leandro Virk  : 1960  MRN: [id number]    Primary Care Physician: Andrea Santos MD  Referring Provider: Leandro George MD    Subjective   Subjective     Reason for Consultation:  Afib, chf, medical management    HPI:  Leandro Virk is a 58 y.o. male with a fib (Coumadin, s/p ablation, pacermaker), SVT s/p ablation, CHF, CAD s/p CABG who presented for a scheduled colectomy with Dr. George. He started experiencing rectal bleeding and constipation. He had a planned colonoscopy that showed a tubulovillous adenoma of the ileocecal valve. Due to this he was referred to Dr. George. Today Dr. George performed right coloectomy and right umbilical hernia repair. He tolerated procedure well. Pain is controlled. No current complaints. Has not passed gas    He has not take Coumadin in a week.     Review of Systems    Otherwise 10-system ROS reviewed and is negative except as mentioned in the HPI.      Past Medical History:   Diagnosis Date   • A-fib (CMS/HCC)     HAS HAD CARDIOVERSIONS; ON COUMADIN   • Acute CHF (CMS/HCC)    • Anemia     RECIEVED 2 UNITS PRBC'S AUG 2018   • Anxiety    • Arthritis    • CAD (coronary artery disease)    • CHF (congestive heart failure) (CMS/HCC)    • COPD (chronic obstructive pulmonary disease) (CMS/HCC)    • Elevated cholesterol    • GERD (gastroesophageal reflux disease)    • History of heart attack    • Hypertension    • Hypothyroid    • Psoriasis    • SVT (supraventricular tachycardia) (CMS/HCC)     HAS HAD ABLATION - PACEMAKER (NOT DEFIB)   • Wears dentures    • Wears glasses        Past Surgical History:   Procedure Laterality Date   • CARDIAC ABLATION      3-4 TIMES    • COLON RESECTION Right 9/10/2018    Procedure: COLECTOMY WITH UMBILICAL HERNIA REPAIR RIGHT;  Surgeon: Leandro George MD;  Location: [facility] include [facility] beds at the [facility];  Service: General   • COLONOSCOPY     • CORONARY ANGIOPLASTY WITH STENT  "PLACEMENT      2 STENTS   • CORONARY ARTERY BYPASS GRAFT  2016    2 VESSELS    • DENTAL PROCEDURE      ALL TEETH REMOVED - DENTURES    • ESOPHAGOSCOPY / EGD     • PACEMAKER IMPLANTATION      [facility] IN [place]        Family History: family history includes Bone cancer in his sister; Colon cancer in his father.     Social History:  reports that he quit smoking about 13 years ago. His smoking use included Cigarettes. He has never used smokeless tobacco. He reports that he does not drink alcohol or use drugs.    Medications:  Prescriptions Prior to Admission   Medication Sig Dispense Refill Last Dose   • aspirin 81 MG EC tablet Take 81 mg by mouth Daily.   9/9/2018 at 0800   • DIGOXIN PO Take 250 mcg by mouth Daily.   9/9/2018 at 0800   • furosemide (LASIX) 40 MG tablet Take 40 mg by mouth Daily. Up to tablets a day if patient feels there is more \"excess fluid buildup\"   9/9/2018 at 2000   • lansoprazole (PREVACID) 30 MG capsule Take 30 mg by mouth 2 (Two) Times a Day.   9/9/2018 at Unknown time   • levothyroxine (SYNTHROID, LEVOTHROID) 75 MCG tablet Take 75 mcg by mouth Daily.   9/9/2018 at Unknown time   • MECLIZINE HCL PO Take 25 mg by mouth 2 (Two) Times a Day.   9/9/2018 at Unknown time   • metoprolol tartrate (LOPRESSOR) 50 MG tablet Take 50 mg by mouth 2 (Two) Times a Day.   9/10/2018 at 0730   • Omega-3 Fatty Acids (FISH OIL) 1000 MG capsule capsule Take 1,000 mg by mouth 2 (Two) Times a Day With Meals.   9/9/2018 at Unknown time   • Pravastatin Sodium (PRAVACHOL PO) Take 40 mg by mouth Daily.   9/9/2018 at Unknown time   • sucralfate (CARAFATE) 1 g tablet Take 1 g by mouth 2 (Two) Times a Day.   9/9/2018 at Unknown time   • IRON PO Take  by mouth Daily.      • warfarin (COUMADIN) 10 MG tablet Take 10 mg by mouth Daily. Stopped prior to surgery per Dr. George's orders; \"okayed\" per Dr. Davis office; spoke with Desirae who spoke with Dr. Santos.   9/1/2018       No Known Allergies    Objective "   Objective     Vital Signs:   Temp:  [93.9 °F (34.4 °C)-98 °F (36.7 °C)] 93.9 °F (34.4 °C)  Heart Rate:  [60-68] 68  Resp:  [16-18] 16  BP: (118-130)/(73-79) 118/74     Physical Exam  Gen-no acute distress, sitting up in chair  CV-RRR, paced, S1 S2 normal, no m/r/g  Resp-CTAB, normal WOB, on RA  Abd-soft, vertical incision noted, covered, CDI, no bowel sounds  Ext-no edema, PPP  Neuro-A&Ox3, no focal deficits  Psych-appropriate mood    Results Reviewed:  I have personally reviewed current lab, radiology, and data and agree.      Results from last 7 days  Lab Units 09/10/18  0909   WBC 10*3/mm3 4.56   HEMOGLOBIN g/dL 12.8*   HEMATOCRIT % 39.7   PLATELETS 10*3/mm3 93*   INR  1.08       Results from last 7 days  Lab Units 09/10/18  0909   SODIUM mmol/L 139   POTASSIUM mmol/L 3.3*   CHLORIDE mmol/L 104   CO2 mmol/L 28.0   BUN mg/dL 10   CREATININE mg/dL 1.11   GLUCOSE mg/dL 122*   CALCIUM mg/dL 9.4     Estimated Creatinine Clearance: 102.3 mL/min (by C-G formula based on SCr of 1.11 mg/dL).       Assessment/Plan   Assessment / Plan     Hospital Problem List     * (Principal)Tubulovillous adenoma polyp of colon    Hypothyroid    GERD (gastroesophageal reflux disease)    Elevated cholesterol    COPD (chronic obstructive pulmonary disease) (CMS/[facility])    CAD (coronary artery disease)    Arthritis    A-fib (CMS/[facility])    Overview Signed 9/11/2018  1:49 PM by Violeta Tenorio APRN     HAS HAD CARDIOVERSIONS; ON COUMADIN               Plan:  --post op care per Dr. George  --restart anti-coagulation when ok with Dr. George. Will need Lovenox bridge  --continue synthroid  --continue metoprolol  --watch volume status, he takes lasix prn at home    Thank you for allowing [facility] Medicine Service to provide consultative care for your patient, we will continue to follow while clinically appropriate.    Electronically signed by EDGARD Contreras, 09/11/18, 1:52 PM.

## 2018-09-11 NOTE — PLAN OF CARE
Problem: Patient Care Overview  Goal: Plan of Care Review   09/11/18 1458   Coping/Psychosocial   Plan of Care Reviewed With patient;spouse   Plan of Care Review   Progress improving       Problem: Infection, Risk/Actual (Adult)  Goal: Infection Prevention/Resolution  Outcome: Ongoing (interventions implemented as appropriate)   09/11/18 1458   Infection, Risk/Actual (Adult)   Infection Prevention/Resolution making progress toward outcome       Problem: Pain, Acute (Adult)  Goal: Acceptable Pain Control/Comfort Level  Outcome: Ongoing (interventions implemented as appropriate)   09/11/18 1458   Pain, Acute (Adult)   Acceptable Pain Control/Comfort Level making progress toward outcome

## 2018-09-11 NOTE — PLAN OF CARE
Problem: Patient Care Overview  Goal: Plan of Care Review  Outcome: Ongoing (interventions implemented as appropriate)   09/10/18 1536 09/10/18 2000   Coping/Psychosocial   Plan of Care Reviewed With --  patient;spouse   Plan of Care Review   Progress no change --      Goal: Discharge Needs Assessment  Outcome: Ongoing (interventions implemented as appropriate)   09/10/18 1536   Discharge Needs Assessment   Readmission Within the Last 30 Days no previous admission in last 30 days   Concerns to be Addressed no discharge needs identified   Patient/Family Anticipates Transition to home   Patient/Family Anticipated Services at Transition none   Transportation Concerns car, none   Transportation Anticipated family or friend will provide   Anticipated Changes Related to Illness none   Equipment Needed After Discharge none   Disability   Equipment Currently Used at Home none     Goal: Interprofessional Rounds/Family Conf  Outcome: Ongoing (interventions implemented as appropriate)   09/10/18 1536   Interdisciplinary Rounds/Family Conf   Participants nursing       Problem: Infection, Risk/Actual (Adult)  Goal: Infection Prevention/Resolution  Outcome: Ongoing (interventions implemented as appropriate)   09/11/18 0442   Infection, Risk/Actual (Adult)   Infection Prevention/Resolution making progress toward outcome       Problem: Pain, Acute (Adult)  Goal: Acceptable Pain Control/Comfort Level  Outcome: Ongoing (interventions implemented as appropriate)   09/11/18 0442   Pain, Acute (Adult)   Acceptable Pain Control/Comfort Level making progress toward outcome

## 2018-09-11 NOTE — PROGRESS NOTES
Discharge Planning Assessment  [facility]     Patient Name: Leandro Virk  MRN: [id number]  Today's Date: 9/11/2018    Admit Date: 9/10/2018          Discharge Needs Assessment     Row Name 09/11/18 1021       Living Environment    Lives With spouse   pt resides in [facility]    Name(s) of Who Lives With Patient wife- Opal    Current Living Arrangements home/apartment/condo    Primary Care Provided by self    Provides Primary Care For no one    Family Caregiver if Needed spouse    Family Caregiver Names Dayton    Quality of Family Relationships helpful;involved;supportive    Able to Return to Prior Arrangements yes       Resource/Environmental Concerns    Resource/Environmental Concerns none       Transition Planning    Patient/Family Anticipates Transition to home with family    Patient/Family Anticipated Services at Transition none    Transportation Anticipated family or friend will provide       Discharge Needs Assessment    Readmission Within the Last 30 Days no previous admission in last 30 days    Equipment Currently Used at Home none    Anticipated Changes Related to Illness none    Equipment Needed After Discharge none            Discharge Plan     Row Name 09/11/18 1022       Plan    Plan home    Patient/Family in Agreement with Plan yes    Plan Comments CM spoke with pt and wife Dayton at bedside. Pt plans to return home and denies discharge needs at this time. Pt will have assistance from his wife and family as needed. CM will continue to follow.     Row Name 09/11/18 0757       Plan    Final Discharge Disposition Code 01 - home or self-care        Destination     No service coordination in this encounter.      Durable Medical Equipment     No service coordination in this encounter.      Dialysis/Infusion     No service coordination in this encounter.      Home Medical Care     No service coordination in this encounter.      Social Care     No service coordination in this encounter.        Expected  Discharge Date and Time     Expected Discharge Date Expected Discharge Time    Sep 13, 2018               Demographic Summary     Row Name 09/11/18 1017       General Information    Referral Source admission list    Preferred Language English    General Information Comments PCP- Andrea Santos       Contact Information    Permission Granted to Share Info With     Contact Information Comments [phone]            Functional Status     Row Name 09/11/18 1020       Functional Status    Usual Activity Tolerance good    Current Activity Tolerance fair       Functional Status, IADL    Medications independent    Meal Preparation independent    Housekeeping independent    Laundry independent    Shopping independent    IADL Comments pt was independent of ADL's prior to hospitalization       Employment/    Employment/ Comments Pt confirms he has [employer], denies concerns or disruption in coverage. Pt confirms he has prescription drug coverage and denies issues obtaining or affording current medications.             Psychosocial    No documentation.           Abuse/Neglect    No documentation.           Legal    No documentation.           Substance Abuse    No documentation.           Patient Forms    No documentation.         Lu Norris

## 2018-09-12 LAB
ANION GAP SERPL CALCULATED.3IONS-SCNC: 4 MMOL/L (ref 3–11)
BUN BLD-MCNC: 12 MG/DL (ref 9–23)
BUN/CREAT SERPL: 12.2 (ref 7–25)
CALCIUM SPEC-SCNC: 9.2 MG/DL (ref 8.7–10.4)
CHLORIDE SERPL-SCNC: 103 MMOL/L (ref 99–109)
CO2 SERPL-SCNC: 28 MMOL/L (ref 20–31)
CREAT BLD-MCNC: 0.98 MG/DL (ref 0.6–1.3)
CYTO UR: NORMAL
DEPRECATED RDW RBC AUTO: 53.9 FL (ref 37–54)
ERYTHROCYTE [DISTWIDTH] IN BLOOD BY AUTOMATED COUNT: 18.2 % (ref 11.3–14.5)
GFR SERPL CREATININE-BSD FRML MDRD: 79 ML/MIN/1.73
GLUCOSE BLD-MCNC: 107 MG/DL (ref 70–100)
HCT VFR BLD AUTO: 34.5 % (ref 38.9–50.9)
HGB BLD-MCNC: 11.1 G/DL (ref 13.1–17.5)
INR PPP: 1.09 (ref 0.91–1.09)
LAB AP CASE REPORT: NORMAL
LAB AP CLINICAL INFORMATION: NORMAL
MCH RBC QN AUTO: 26.2 PG (ref 27–31)
MCHC RBC AUTO-ENTMCNC: 32.2 G/DL (ref 32–36)
MCV RBC AUTO: 81.4 FL (ref 80–99)
PATH REPORT.FINAL DX SPEC: NORMAL
PATH REPORT.GROSS SPEC: NORMAL
PLATELET # BLD AUTO: 96 10*3/MM3 (ref 150–450)
PMV BLD AUTO: 8.6 FL (ref 6–12)
POTASSIUM BLD-SCNC: 3.7 MMOL/L (ref 3.5–5.5)
PROTHROMBIN TIME: 11.4 SECONDS (ref 9.6–11.5)
RBC # BLD AUTO: 4.24 10*6/MM3 (ref 4.2–5.76)
SODIUM BLD-SCNC: 135 MMOL/L (ref 132–146)
WBC NRBC COR # BLD: 5.95 10*3/MM3 (ref 3.5–10.8)

## 2018-09-12 PROCEDURE — 80048 BASIC METABOLIC PNL TOTAL CA: CPT | Performed by: NURSE PRACTITIONER

## 2018-09-12 PROCEDURE — 99232 SBSQ HOSP IP/OBS MODERATE 35: CPT | Performed by: NURSE PRACTITIONER

## 2018-09-12 PROCEDURE — 85027 COMPLETE CBC AUTOMATED: CPT | Performed by: NURSE PRACTITIONER

## 2018-09-12 PROCEDURE — 25010000002 ENOXAPARIN PER 10 MG: Performed by: COLON & RECTAL SURGERY

## 2018-09-12 PROCEDURE — 85610 PROTHROMBIN TIME: CPT | Performed by: COLON & RECTAL SURGERY

## 2018-09-12 RX ORDER — WARFARIN SODIUM 5 MG/1
10 TABLET ORAL
Status: DISCONTINUED | OUTPATIENT
Start: 2018-09-12 | End: 2018-09-13 | Stop reason: HOSPADM

## 2018-09-12 RX ADMIN — DIAZEPAM 5 MG: 5 TABLET ORAL at 20:51

## 2018-09-12 RX ADMIN — ENOXAPARIN SODIUM 40 MG: 40 INJECTION SUBCUTANEOUS at 08:02

## 2018-09-12 RX ADMIN — HYDROCODONE BITARTRATE AND ACETAMINOPHEN 1 TABLET: 7.5; 325 TABLET ORAL at 15:55

## 2018-09-12 RX ADMIN — ACETAMINOPHEN 1000 MG: 500 TABLET, FILM COATED ORAL at 08:02

## 2018-09-12 RX ADMIN — HYDROCODONE BITARTRATE AND ACETAMINOPHEN 1 TABLET: 7.5; 325 TABLET ORAL at 09:16

## 2018-09-12 RX ADMIN — DIAZEPAM 5 MG: 5 TABLET ORAL at 04:13

## 2018-09-12 RX ADMIN — LEVOTHYROXINE SODIUM 75 MCG: 75 TABLET ORAL at 05:34

## 2018-09-12 RX ADMIN — METOPROLOL TARTRATE 50 MG: 50 TABLET ORAL at 08:02

## 2018-09-12 RX ADMIN — HYDROCODONE BITARTRATE AND ACETAMINOPHEN 1 TABLET: 7.5; 325 TABLET ORAL at 04:13

## 2018-09-12 RX ADMIN — WARFARIN SODIUM 10 MG: 5 TABLET ORAL at 18:04

## 2018-09-12 RX ADMIN — DIGOXIN 250 MCG: 250 TABLET ORAL at 11:54

## 2018-09-12 RX ADMIN — ACETAMINOPHEN 1000 MG: 500 TABLET, FILM COATED ORAL at 20:51

## 2018-09-12 RX ADMIN — HYDROCODONE BITARTRATE AND ACETAMINOPHEN 1 TABLET: 7.5; 325 TABLET ORAL at 20:51

## 2018-09-12 RX ADMIN — METOPROLOL TARTRATE 50 MG: 50 TABLET ORAL at 20:51

## 2018-09-12 RX ADMIN — ALVIMOPAN 12 MG: 12 CAPSULE ORAL at 20:51

## 2018-09-12 RX ADMIN — PANTOPRAZOLE SODIUM 40 MG: 40 TABLET, DELAYED RELEASE ORAL at 05:34

## 2018-09-12 RX ADMIN — ALVIMOPAN 12 MG: 12 CAPSULE ORAL at 08:02

## 2018-09-12 NOTE — PROGRESS NOTES
Alert and oriented  No nausea or vomiting  Abdomen soft but still no bowel movement  Pathology pending  Hopefully home in the morning.  INR 1.09

## 2018-09-12 NOTE — PLAN OF CARE
Problem: Patient Care Overview  Goal: Plan of Care Review  Outcome: Ongoing (interventions implemented as appropriate)   09/12/18 1501   Coping/Psychosocial   Plan of Care Reviewed With patient;spouse   Plan of Care Review   Progress improving       Problem: Infection, Risk/Actual (Adult)  Goal: Infection Prevention/Resolution  Outcome: Ongoing (interventions implemented as appropriate)   09/12/18 1501   Infection, Risk/Actual (Adult)   Infection Prevention/Resolution achieves outcome       Problem: Pain, Acute (Adult)  Goal: Acceptable Pain Control/Comfort Level  Outcome: Ongoing (interventions implemented as appropriate)   09/12/18 1501   Pain, Acute (Adult)   Acceptable Pain Control/Comfort Level making progress toward outcome

## 2018-09-12 NOTE — PROGRESS NOTES
"Pharmacy Consult  -  Warfarin    Leandro Virk is a  58 y.o. male   Height - 193 cm (76\")  Weight - 113 kg (249 lb)    Consulting Provider: - Andra  Indication: -A. Fib  Goal INR: - 2-3  Home Regimen:   - Warfarin 10mg Daily (70 mg weekly)  Bridge Therapy: No, has Enoxaparin 40mg Daily ppx    Drug-Drug Interactions with current regimen:   Acetaminophen, Enoxaparin- may result in an increased risk of bleeding    Warfarin Dosing During Admission:    Date  9/11 9/12          INR  1.05  1.09          Dose  12mg  10 mg             Education Provided:    Discharge Follow up:    Following Provider -  Dr Alejandro [phone]  Follow up time range or appointment - within 2-3 days of discharge    Labs:  Results from last 7 days   Lab Units 09/12/18  1053 09/12/18  0613 09/11/18  1938 09/10/18  0909   INR  --  1.09 1.05 1.08   APTT seconds --  --  --  27.8   HEMOGLOBIN g/dL 11.1* --  --  12.8*   HEMATOCRIT % 34.5* --  --  39.7   PLATELETS 10*3/mm3 96* --  --  93*     Results from last 7 days   Lab Units 09/12/18  1053 09/10/18  0909   SODIUM mmol/L 135 139   POTASSIUM mmol/L 3.7 3.3*   CHLORIDE mmol/L 103 104   CO2 mmol/L 28.0 28.0   BUN mg/dL 12 10   CREATININE mg/dL 0.98 1.11   CALCIUM mg/dL 9.2 9.4   GLUCOSE mg/dL 107* 122*     Current dietary intake:   Diet Order   Procedures   • Diet Full Liquid   Documented intake %    Assessment/Plan:     INR SUBtherapeutic today at 1.09 (warfarin held prior to procedure)  Warfarin restarted 9/11 with boost dose of 12 mg    Recommend to resume home dose warfarin 10 mg oral daily - note full liquid diet, indication.  Pharmacy will continue to follow and make adjustments as needed.     Thank you  Perla Justin, PharmD  9/12/2018  2:19 PM        "

## 2018-09-12 NOTE — PLAN OF CARE
Problem: Patient Care Overview  Goal: Plan of Care Review  Outcome: Ongoing (interventions implemented as appropriate)   09/11/18 1458 09/11/18 2000   Coping/Psychosocial   Plan of Care Reviewed With --  patient;spouse   Plan of Care Review   Progress improving --      Goal: Discharge Needs Assessment  Outcome: Ongoing (interventions implemented as appropriate)   09/10/18 1536 09/11/18 1021   Discharge Needs Assessment   Readmission Within the Last 30 Days --  no previous admission in last 30 days   Concerns to be Addressed no discharge needs identified --    Patient/Family Anticipates Transition to --  home with family   Patient/Family Anticipated Services at Transition --  none   Transportation Concerns car, none --    Transportation Anticipated --  family or friend will provide   Anticipated Changes Related to Illness --  none   Equipment Needed After Discharge --  none   Disability   Equipment Currently Used at Home --  none     Goal: Interprofessional Rounds/Family Conf  Outcome: Ongoing (interventions implemented as appropriate)   09/10/18 1536   Interdisciplinary Rounds/Family Conf   Participants nursing       Problem: Infection, Risk/Actual (Adult)  Goal: Infection Prevention/Resolution  Outcome: Ongoing (interventions implemented as appropriate)   09/11/18 1458   Infection, Risk/Actual (Adult)   Infection Prevention/Resolution making progress toward outcome       Problem: Pain, Acute (Adult)  Goal: Acceptable Pain Control/Comfort Level  Outcome: Ongoing (interventions implemented as appropriate)   09/12/18 0316   Pain, Acute (Adult)   Acceptable Pain Control/Comfort Level making progress toward outcome

## 2018-09-12 NOTE — PROGRESS NOTES
[facility] Medicine Services  PROGRESS NOTE    Patient Name: Leandro Virk  : 1960  MRN: [id number]    Date of Admission: 9/10/2018  Length of Stay: 2  Primary Care Physician: Andrea Santos MD    Subjective   Subjective     CC:  F/u afib, htn, medical management    HPI:  No BM or gas yet, pain is controlled, feels more tired today, no N/V, tolerating diet    Review of Systems  Gen- No fevers, chills  CV- No chest pain, palpitations  Resp- No cough, dyspnea    Otherwise ROS is negative except as mentioned in the HPI.    Objective   Objective     Vital Signs:   Temp:  [93.9 °F (34.4 °C)-98.4 °F (36.9 °C)] 98.4 °F (36.9 °C)  Heart Rate:  [60-68] 66  Resp:  [16-17] 17  BP: (118-148)/(74-91) 148/91        Physical Exam:  Gen-no acute distress, sitting up in chair  CV-RRR, S1 S2 normal, no m/r/g, paced, sinus  Resp-CTAB, normal WOB, on RA  Abd-soft, ND, +BS, midline vertical incision noted  Ext-no edema, PPP  Neuro-A&Ox3, no focal deficits  Psych-appropriate mood    Results Reviewed:  I have personally reviewed current lab, radiology, and data and agree.      Results from last 7 days  Lab Units 18  0613 18  1938 09/10/18  0909   WBC 10*3/mm3  --   --  4.56   HEMOGLOBIN g/dL  --   --  12.8*   HEMATOCRIT %  --   --  39.7   PLATELETS 10*3/mm3  --   --  93*   INR  1.09 1.05 1.08       Results from last 7 days  Lab Units 09/10/18  0909   SODIUM mmol/L 139   POTASSIUM mmol/L 3.3*   CHLORIDE mmol/L 104   CO2 mmol/L 28.0   BUN mg/dL 10   CREATININE mg/dL 1.11   GLUCOSE mg/dL 122*   CALCIUM mg/dL 9.4     Estimated Creatinine Clearance: 102.3 mL/min (by C-G formula based on SCr of 1.11 mg/dL).       I have reviewed the medications.      acetaminophen 1,000 mg Oral TID   alvimopan 12 mg Oral BID   digoxin 250 mcg Oral Daily   enoxaparin 40 mg Subcutaneous Daily   levothyroxine 75 mcg Oral Q AM   metoprolol tartrate 50 mg Oral Q12H   pantoprazole 40 mg Oral QAM   warfarin 12 mg Oral  Daily         Assessment/Plan   Assessment / Plan     Hospital Problem List     * (Principal)Tubulovillous adenoma polyp of colon    Hypothyroid    GERD (gastroesophageal reflux disease)    Elevated cholesterol    COPD (chronic obstructive pulmonary disease) (CMS/HCC)    CAD (coronary artery disease)    Arthritis    A-fib (CMS/HCC)    Overview Signed 9/11/2018  1:49 PM by Violeta Tenorio APRN     HAS HAD CARDIOVERSIONS; ON COUMADIN             Brief Hospital Course to date:  Leandro Virk is a 58 y.o. male with a fib (Coumadin, s/p ablation, pacermaker), SVT s/p ablation, CHF, CAD s/p CABG who presented for a scheduled colectomy with Dr. George. He started experiencing rectal bleeding and constipation. He had a planned colonoscopy that showed a tubulovillous adenoma of the ileocecal valve. Due to this he was referred to Dr. George. On 9/10 Dr. George performed right coloectomy and right umbilical hernia repair. He tolerated procedure well. Hospitalist consulted for medical management.     Assessment & Plan:  --post op care per Dr. George  --continue coumadin, will hold on Lovenox bridge for now since this is a fib and just let INR drift up, he is in sinus, seems prudent  --continue synthroid  --continue metoprolol  --watch volume status, he take lasix prn at home    DVT Prophylaxis: Lovenox SQ    CODE STATUS:   Code Status and Medical Interventions:   Ordered at: 09/10/18 1317     Code Status:    CPR     Medical Interventions (Level of Support Prior to Arrest):    Full     Disposition: I expect the patient to be discharged per primary service    Electronically signed by EDGARD Contreras, 09/12/18, 11:01 AM.

## 2018-09-13 VITALS
RESPIRATION RATE: 16 BRPM | HEIGHT: 76 IN | SYSTOLIC BLOOD PRESSURE: 124 MMHG | TEMPERATURE: 97.2 F | OXYGEN SATURATION: 93 % | WEIGHT: 249 LBS | BODY MASS INDEX: 30.32 KG/M2 | DIASTOLIC BLOOD PRESSURE: 85 MMHG | HEART RATE: 66 BPM

## 2018-09-13 LAB
INR PPP: 1.28 (ref 0.91–1.09)
PROTHROMBIN TIME: 13.4 SECONDS (ref 9.6–11.5)

## 2018-09-13 PROCEDURE — 25010000002 ENOXAPARIN PER 10 MG: Performed by: COLON & RECTAL SURGERY

## 2018-09-13 PROCEDURE — 99239 HOSP IP/OBS DSCHRG MGMT >30: CPT | Performed by: PHYSICIAN ASSISTANT

## 2018-09-13 PROCEDURE — 85610 PROTHROMBIN TIME: CPT | Performed by: COLON & RECTAL SURGERY

## 2018-09-13 RX ORDER — HYDROCODONE BITARTRATE AND ACETAMINOPHEN 7.5; 325 MG/1; MG/1
TABLET ORAL
Qty: 21 TABLET | Refills: 0 | Status: SHIPPED | OUTPATIENT
Start: 2018-09-13

## 2018-09-13 RX ORDER — PYRIDOSTIGMINE BROMIDE 60 MG/1
60 TABLET ORAL EVERY 6 HOURS SCHEDULED
Status: DISCONTINUED | OUTPATIENT
Start: 2018-09-13 | End: 2018-09-13 | Stop reason: HOSPADM

## 2018-09-13 RX ORDER — FUROSEMIDE 40 MG/1
40 TABLET ORAL DAILY
Start: 2018-09-13 | End: 2018-09-13

## 2018-09-13 RX ORDER — FUROSEMIDE 40 MG/1
40 TABLET ORAL DAILY PRN
Start: 2018-09-13

## 2018-09-13 RX ORDER — POLYETHYLENE GLYCOL 3350 17 G/17G
17 POWDER, FOR SOLUTION ORAL DAILY
Qty: 60 EACH | Refills: 5 | Status: SHIPPED | OUTPATIENT
Start: 2018-09-13

## 2018-09-13 RX ORDER — ACETAMINOPHEN 500 MG
1000 TABLET ORAL 3 TIMES DAILY
Qty: 42 TABLET | Refills: 0
Start: 2018-09-13 | End: 2018-09-20

## 2018-09-13 RX ADMIN — HYDROCODONE BITARTRATE AND ACETAMINOPHEN 1 TABLET: 7.5; 325 TABLET ORAL at 08:43

## 2018-09-13 RX ADMIN — LEVOTHYROXINE SODIUM 75 MCG: 75 TABLET ORAL at 05:14

## 2018-09-13 RX ADMIN — PYRIDOSTIGMINE BROMIDE 60 MG: 60 TABLET ORAL at 12:15

## 2018-09-13 RX ADMIN — ACETAMINOPHEN 1000 MG: 500 TABLET, FILM COATED ORAL at 08:44

## 2018-09-13 RX ADMIN — DIGOXIN 250 MCG: 250 TABLET ORAL at 12:15

## 2018-09-13 RX ADMIN — PANTOPRAZOLE SODIUM 40 MG: 40 TABLET, DELAYED RELEASE ORAL at 05:14

## 2018-09-13 RX ADMIN — ALVIMOPAN 12 MG: 12 CAPSULE ORAL at 08:43

## 2018-09-13 RX ADMIN — METOPROLOL TARTRATE 50 MG: 50 TABLET ORAL at 08:43

## 2018-09-13 RX ADMIN — ENOXAPARIN SODIUM 40 MG: 40 INJECTION SUBCUTANEOUS at 08:43

## 2018-09-13 RX ADMIN — HYDROCODONE BITARTRATE AND ACETAMINOPHEN 1 TABLET: 7.5; 325 TABLET ORAL at 04:01

## 2018-09-13 RX ADMIN — HYDROCODONE BITARTRATE AND ACETAMINOPHEN 1 TABLET: 7.5; 325 TABLET ORAL at 14:39

## 2018-09-13 NOTE — PROGRESS NOTES
"Pharmacy Consult  -  Warfarin    Leandro Virk is a  58 y.o. male   Height - 193 cm (76\")  Weight - 113 kg (249 lb)    Consulting Provider: - Doris  Indication: -A. Fib  Goal INR: - 2-3  Home Regimen:   - Warfarin 10mg Daily (70 mg weekly)  Bridge Therapy: No, has Enoxaparin 40mg Daily ppx    Drug-Drug Interactions with current regimen:   Acetaminophen, Enoxaparin- may result in an increased risk of bleeding    Warfarin Dosing During Admission:    Date  9/11 9/12 9/13         INR  1.05  1.09 1.28         Dose  12mg  10 mg (10mg)            Education Provided: on PTA, will f/u and answer questions as needed    Discharge Follow up:    Following Provider -  Dr Alejandro [phone]  Follow up time range or appointment - within 2-3 days of discharge    Labs:  Results from last 7 days   Lab Units 09/13/18  0633 09/12/18  1053 09/12/18  0613 09/11/18  1938 09/10/18  0909   INR  1.28* --  1.09 1.05 1.08   APTT seconds --  --  --  --  27.8   HEMOGLOBIN g/dL --  11.1* --  --  12.8*   HEMATOCRIT % --  34.5* --  --  39.7   PLATELETS 10*3/mm3 --  96* --  --  93*     Results from last 7 days   Lab Units 09/12/18  1053 09/10/18  0909   SODIUM mmol/L 135 139   POTASSIUM mmol/L 3.7 3.3*   CHLORIDE mmol/L 103 104   CO2 mmol/L 28.0 28.0   BUN mg/dL 12 10   CREATININE mg/dL 0.98 1.11   CALCIUM mg/dL 9.2 9.4   GLUCOSE mg/dL 107* 122*       Diet Order   Procedures   • Diet Regular; GI Soft/[place]   Documented intake %    Assessment/Plan:     1. INR is SUBtherapeutic today, however it is beginning to trend up 1.09 => 1.28 today.  Will CONTINUE with current dose of warfarin 10mg daily.  2. Continue to check INR daily, dietary intake, drug-drug interactions and s/sx of bleeding or clotting.  3. Pharmacy will continue to follow.      Luana Morrison, PharmD  9/13/2018  11:43 AM      "

## 2018-09-13 NOTE — PLAN OF CARE
Problem: Patient Care Overview  Goal: Plan of Care Review  Outcome: Ongoing (interventions implemented as appropriate)   09/12/18 1501 09/12/18 2000 09/13/18 0428   Coping/Psychosocial   Plan of Care Reviewed With --  patient;spouse --    Plan of Care Review   Progress improving --  --    OTHER   Outcome Summary --  --  Pt resting well at this time. Pain medication given for complaints of abdominal pain. Ambulating in rosen this shift. Bowel sounds hypoactive. Awaiting return of bowel function.      Goal: Discharge Needs Assessment  Outcome: Ongoing (interventions implemented as appropriate)   09/10/18 1536 09/11/18 1021   Discharge Needs Assessment   Readmission Within the Last 30 Days --  no previous admission in last 30 days   Concerns to be Addressed no discharge needs identified --    Patient/Family Anticipates Transition to --  home with family   Patient/Family Anticipated Services at Transition --  none   Transportation Concerns car, none --    Transportation Anticipated --  family or friend will provide   Anticipated Changes Related to Illness --  none   Equipment Needed After Discharge --  none   Disability   Equipment Currently Used at Home --  none     Goal: Interprofessional Rounds/Family Conf  Outcome: Ongoing (interventions implemented as appropriate)   09/10/18 1536   Interdisciplinary Rounds/Family Conf   Participants nursing       Problem: Infection, Risk/Actual (Adult)  Goal: Infection Prevention/Resolution  Outcome: Ongoing (interventions implemented as appropriate)   09/13/18 0428   Infection, Risk/Actual (Adult)   Infection Prevention/Resolution making progress toward outcome       Problem: Pain, Acute (Adult)  Goal: Acceptable Pain Control/Comfort Level  Outcome: Ongoing (interventions implemented as appropriate)   09/13/18 0428   Pain, Acute (Adult)   Acceptable Pain Control/Comfort Level making progress toward outcome

## 2018-09-13 NOTE — DISCHARGE SUMMARY
[facility] Medicine Services  DISCHARGE SUMMARY    Patient Name: Leandro Virk  : 1960  MRN: [id number]    Date of Admission: 9/10/2018  Date of Discharge:  2018  Primary Care Physician: Andrea Santos MD    Consults     Date and Time Order Name Status Description    9/10/2018 1317 Inpatient Hospitalist Consult Completed         Hospital Course     Presenting Problem:   Tubulovillous adenoma polyp of colon [D12.6]    Active Hospital Problems    Diagnosis Date Noted   • **Tubulovillous adenoma polyp of colon [D12.6] 09/10/2018   • Hypothyroid [E03.9]    • GERD (gastroesophageal reflux disease) [K21.9]    • Elevated cholesterol [E78.00]    • COPD (chronic obstructive pulmonary disease) (CMS/HCC) [J44.9]    • CAD (coronary artery disease) [I25.10]    • Arthritis [M19.90]    • A-fib (CMS/[facility]) [I48.91]       Resolved Hospital Problems    Diagnosis Date Noted Date Resolved   No resolved problems to display.      Hospital Course:  Leandro Virk is a 58 y.o. male with PMH significant for HTN, hyperlipidemia, COPD, paroxysmal atrial fibrillation (s/p ECV, on Coumadin), CAD s/p CABG and hypothyroidism. Patient had colonoscopy with tubulovillous adenoma of the ileocecal valve. He was referred to Dr. George and admitted to [facility] on 9/10/18 for planned R colectomy with R umbilical hernia repair. He tolerated the procedure well and has had no post-operative complications.     He is tolerating a regular diet and has had two small bowel movements.   Coumadin resumed on 18. He is in sinus rhythm and will resume Coumadin without Lovenox bridge. PCP to repeat CBC and INR at follow up appointment next week.   Miralax prescribed for constipation.    Follow up with Dr. George in 1 week     Day of Discharge     HPI:   Up in chair, family at bedside. Pain is controlled. No nausea or vomiting. Two small, loose bowel movements this morning. Tolerating a regular diet. Feels  ready to go home.   Reviewed pathology with patient and family.     Review of Systems  Gen- No fevers, chills  CV- No chest pain, palpitations  Resp- No cough, dyspnea  GI- No N/V/D, (+) post-surgical abd pain    Otherwise ROS is negative except as mentioned in the HPI.    Vital Signs:   Temp:  [97.2 °F (36.2 °C)-98.4 °F (36.9 °C)] 97.2 °F (36.2 °C)  Heart Rate:  [66-76] 66  Resp:  [16-18] 16  BP: (124-138)/(83-85) 124/85     Physical Exam:  Constitutional: No acute distress, awake, alert  HENT: NCAT, mucous membranes moist  Respiratory: Clear to auscultation bilaterally, respiratory effort normal   Cardiovascular: RRR, no murmurs, rubs, or gallops, palpable pedal pulses bilaterally  Gastrointestinal: Positive bowel sounds, soft, appropriately tender. Surgical incision is dressed, not removed for exam.   Musculoskeletal: No bilateral ankle edema  Psychiatric: Appropriate affect, cooperative  Neurologic: Oriented x 3, strength symmetric in all extremities, Cranial Nerves grossly intact to confrontation, speech clear  Skin: No rashes    Pertinent  and/or Most Recent Results       Results from last 7 days  Lab Units 09/12/18  1053 09/10/18  0909   WBC 10*3/mm3 5.95 4.56   HEMOGLOBIN g/dL 11.1* 12.8*   HEMATOCRIT % 34.5* 39.7   PLATELETS 10*3/mm3 96* 93*   SODIUM mmol/L 135 139   POTASSIUM mmol/L 3.7 3.3*   CHLORIDE mmol/L 103 104   CO2 mmol/L 28.0 28.0   BUN mg/dL 12 10   CREATININE mg/dL 0.98 1.11   GLUCOSE mg/dL 107* 122*   CALCIUM mg/dL 9.2 9.4       Results from last 7 days  Lab Units 09/13/18  0633 09/12/18  0613 09/11/18  1938 09/10/18  0909   PROTIME Seconds 13.4* 11.4 11.0 11.3   INR  1.28* 1.09 1.05 1.08   APTT seconds  --   --   --  27.8         Brief Urine Lab Results     None        Microbiology Results Abnormal     None        Imaging Results (all)     None        Discharge Details        Discharge Medications      New Medications      Instructions Start Date   acetaminophen 500 MG tablet  Commonly known  "as:  TYLENOL   1,000 mg, Oral, 3 Times Daily, Then four times daily as needed      HYDROcodone-acetaminophen 7.5-325 MG per tablet  Commonly known as:  NORCO   Take 1 tablet by mouth Every 4-6 Hours As Needed for Moderate or Severe Pain      polyethylene glycol packet  Commonly known as:  MIRALAX   17 g, Oral, Daily, May take two times daily as needed for constipation         Continue These Medications      Instructions Start Date   aspirin 81 MG EC tablet   81 mg, Oral, Daily      DIGOXIN PO   250 mcg, Oral, Daily      fish oil 1000 MG capsule capsule   1,000 mg, Oral, 2 Times Daily With Meals      furosemide 40 MG tablet  Commonly known as:  LASIX   40 mg, Oral, Daily, Up to tablets a day if patient feels there is more \"excess fluid buildup\"       IRON PO   Oral, Daily      lansoprazole 30 MG capsule  Commonly known as:  PREVACID   30 mg, Oral, 2 Times Daily      levothyroxine 75 MCG tablet  Commonly known as:  SYNTHROID, LEVOTHROID   75 mcg, Oral, Daily      metoprolol tartrate 50 MG tablet  Commonly known as:  LOPRESSOR   50 mg, Oral, 2 Times Daily      PRAVACHOL PO   40 mg, Oral, Daily      sucralfate 1 g tablet  Commonly known as:  CARAFATE   1 g, Oral, 2 Times Daily      warfarin 10 MG tablet  Commonly known as:  COUMADIN   10 mg, Oral, Daily Warfarin, Stopped prior to surgery per Dr. George's orders; \"okayed\" per Dr. Davis office; spoke with Desirae who spoke with Dr. Santos.          Stop These Medications    MECLIZINE HCL PO          Discharge Disposition:  Home or Self Care    Discharge Diet:  Diet Instructions     Diet: Regular, Cardiac; Thin       Discharge Diet:   Regular  Cardiac       Fluid Consistency:  Thin        Discharge Activity:   Activity Instructions     Activity as Tolerated       Discharge Activity Restrictions       Keep incision clean, dry and intact.  Do not scrub or soak incision.   Dress as needed for drainage, leave open to air when able to.   Contact your surgeon if you develop " increased pain, redness, swelling or purulent/foul-smelling discharge     No heavy lifting for 4-6 weeks        Code Status/Level of Support:  Code Status and Medical Interventions:   Ordered at: 09/10/18 1317     Code Status:    CPR     Medical Interventions (Level of Support Prior to Arrest):    Full     No future appointments.    Additional Instructions for the Follow-ups that You Need to Schedule     Discharge Follow-up with PCP    As directed      Currently Documented PCP:  Andrea Santos MD  PCP Phone Number:  [phone]    Follow Up Details:  Follow up with PCP in 1 week. Needs CBC and INR         Discharge Follow-up with Specified Provider: ELIO 1 week    As directed      To:  ELIO 1 week             Time Spent on Discharge:  40 minutes    Electronically signed by Isa Fiore PA-C, 09/13/18, 2:19 PM.    I supervised care of the patient on day of discharge with direct care provided by the advanced care provider (APC).

## (undated) DEVICE — COVER,LIGHT HANDLE,FLX,1/PK: Brand: MEDLINE INDUSTRIES, INC.

## (undated) DEVICE — DRSNG WND GZ PAD BORDERED 4X8IN STRL

## (undated) DEVICE — TUBING, SUCTION, 1/4" X 10', STRAIGHT: Brand: MEDLINE

## (undated) DEVICE — MEDI-VAC YANKAUER SUCTION HANDLE: Brand: CARDINAL HEALTH

## (undated) DEVICE — CANNULA,OXY,ADULT,SUPERSOFT,W/7'TUB,UC: Brand: MEDLINE

## (undated) DEVICE — TOWEL,OR,DSP,ST,BLUE,STD,4/PK,20PK/CS: Brand: MEDLINE

## (undated) DEVICE — TOTAL TRAY, 16FR 10ML SIL FOLEY, URN: Brand: MEDLINE

## (undated) DEVICE — SPNG LAP PREWSH SFTPK 18X18IN STRL PK/5

## (undated) DEVICE — LEX GENERAL ABDOMINAL SPLIT: Brand: MEDLINE INDUSTRIES, INC.

## (undated) DEVICE — MEDI-VAC NON-CONDUCTIVE SUCTION TUBING: Brand: CARDINAL HEALTH

## (undated) DEVICE — SAFESECURE,SECUREMENT,FOLEY CATH,STERILE: Brand: MEDLINE

## (undated) DEVICE — COVER,MAYO STAND,XL,STERILE: Brand: MEDLINE

## (undated) DEVICE — PENCL E/S HNDSWCH ROCKRBTN HOLSTR 10FT

## (undated) DEVICE — ANTIBACTERIAL UNDYED BRAIDED (POLYGLACTIN 910), SYNTHETIC ABSORBABLE SUTURE: Brand: COATED VICRYL

## (undated) DEVICE — GLV SURG SENSICARE MICRO PF LF 8.5 STRL

## (undated) DEVICE — SOL LR 1000ML

## (undated) DEVICE — SUT PDS 1 CTX 36IN VIO PDP371T

## (undated) DEVICE — SUT VIC 12X27 D8116 BX/12

## (undated) DEVICE — INTENDED FOR TISSUE SEPARATION, AND OTHER PROCEDURES THAT REQUIRE A SHARP SURGICAL BLADE TO PUNCTURE OR CUT.: Brand: BARD-PARKER ® STAINLESS STEEL BLADES

## (undated) DEVICE — MEDI-VAC YANKAUER SUCTION HANDLE W/BULBOUS TIP: Brand: CARDINAL HEALTH

## (undated) DEVICE — DUAL LUMEN STOMACH TUBE,ANTI-REFLUX VALVE: Brand: SALEM SUMP